# Patient Record
Sex: MALE | Race: OTHER | HISPANIC OR LATINO | URBAN - METROPOLITAN AREA
[De-identification: names, ages, dates, MRNs, and addresses within clinical notes are randomized per-mention and may not be internally consistent; named-entity substitution may affect disease eponyms.]

---

## 2020-03-14 ENCOUNTER — EMERGENCY (EMERGENCY)
Facility: HOSPITAL | Age: 35
LOS: 1 days | Discharge: HOME | End: 2020-03-14
Attending: EMERGENCY MEDICINE
Payer: SELF-PAY

## 2020-03-14 VITALS
DIASTOLIC BLOOD PRESSURE: 78 MMHG | WEIGHT: 125 LBS | RESPIRATION RATE: 16 BRPM | TEMPERATURE: 99 F | HEIGHT: 65 IN | OXYGEN SATURATION: 98 % | SYSTOLIC BLOOD PRESSURE: 113 MMHG | HEART RATE: 77 BPM

## 2020-03-14 VITALS — OXYGEN SATURATION: 98 % | HEART RATE: 78 BPM | TEMPERATURE: 99 F | RESPIRATION RATE: 16 BRPM

## 2020-03-14 PROCEDURE — 99283 EMERGENCY DEPT VISIT LOW MDM: CPT

## 2020-03-14 RX ORDER — ACETAMINOPHEN 500 MG
650 TABLET ORAL ONCE
Refills: 0 | Status: COMPLETED | OUTPATIENT
Start: 2020-03-14 | End: 2020-03-14

## 2020-03-14 RX ORDER — IBUPROFEN 200 MG
1 TABLET ORAL
Qty: 12 | Refills: 0
Start: 2020-03-14 | End: 2020-03-17

## 2020-03-14 RX ADMIN — Medication 650 MILLIGRAM(S): at 22:42

## 2020-03-14 RX ADMIN — Medication 650 MILLIGRAM(S): at 23:27

## 2020-03-14 NOTE — ED PROVIDER NOTE - OBJECTIVE STATEMENT
34 year old male presenting with complaints of subjective fever. body aches, and cough x3 days. Patient notes that he always has some chest pain and at baseline is short of breath and it is not worse today. Patient notes that he took ibuprofen today at 3PM which did not relieve his symptoms. Patient also complains of an itchy rash on the right lateral wrist. Patient denies sore throat, ear pain, vomiting, or any other acute complaints.

## 2020-03-14 NOTE — ED ADULT NURSE NOTE - OBJECTIVE STATEMENT
States he has fever , cough ,bodyaches ,sorethroat x3 days . aware of repeat vs ,Patient declined repeat BP.

## 2020-03-14 NOTE — ED PROVIDER NOTE - CONSTITUTIONAL, MLM
normal... Afebrile, awake, alert, oriented to person, place, time/situation and in no apparent distress.

## 2020-03-14 NOTE — ED PROVIDER NOTE - PATIENT PORTAL LINK FT
You can access the FollowMyHealth Patient Portal offered by [facility] by registering at the following website: [email or web address]. By joining [facility]’s FollowMyHealth portal, you will also be able to view your health information using other applications (apps) compatible with our system.

## 2025-01-20 NOTE — ED ADULT NURSE NOTE - PAIN RATING/NUMBER SCALE (0-10): ACTIVITY
Quality 226: Preventive Care And Screening: Tobacco Use: Screening And Cessation Intervention: Patient screened for tobacco use and is an ex/non-smoker Detail Level: Detailed 6